# Patient Record
(demographics unavailable — no encounter records)

---

## 2024-11-11 NOTE — COUNSELING
[Underweight - ( BMI  <18.5 )] : underweight - ( BMI  <18.5 ) [Non - Smoker] : non-smoker [Smoke/CO Detectors] : smoke/CO detectors [Use grab bars] : use grab bars [Discussed disease trajectory with patient/caregiver] : discussed disease trajectory with patient/caregiver

## 2024-11-18 NOTE — COMPREHENSIVE ASSESSMENT
[Comprehensive Assessment Reviewed] : Comprehensive assessment reviewed
[Comprehensive Assessment Reviewed] : Comprehensive assessment reviewed
good, to achieve stated therapy goals

## 2024-11-22 NOTE — REASON FOR VISIT
[Post-hospitalization from ___ Hospital] : Post-hospitalization from [unfilled] Hospital [Discharge Summary] : discharge summary [Follow-Up] : a follow-up visit [Family Member] : family member [Pre-Visit Preparation] : pre-visit preparation was done [FreeTextEntry2] : Chart Review

## 2024-11-22 NOTE — PHYSICAL EXAM
[No Acute Distress] : no acute distress [Normal Sclera/Conjunctiva] : normal sclera/conjunctiva [EOMI] : extra ocular movement intact [Normal Outer Ear/Nose] : the ears and nose were normal in appearance [Normal Oropharynx] : the oropharynx was normal [Supple] : the neck was supple [No Respiratory Distress] : no respiratory distress [Clear to Auscultation] : lungs were clear to auscultation bilaterally [No Accessory Muscle Use] : no accessory muscle use [Normal Rate] : heart rate was normal  [Regular Rhythm] : with a regular rhythm [Normal S1, S2] : normal S1 and S2 [No Murmurs] : no murmurs heard [No Edema] : there was no peripheral edema [Normal Bowel Sounds] : normal bowel sounds [Non Tender] : non-tender [Soft] : abdomen soft [Not Distended] : not distended [Patient Refused] : rectal exam was refused by the patient [No CVA Tenderness] : no ~M costovertebral angle tenderness [Normal Strength/Tone] : muscle strength and tone were normal [No Rash] : no rash [de-identified] : Cataract surgery to bilateral eyes  [de-identified] : Upper dentures  [de-identified] : Breast cancer with mastectomy to left breast diagnosed in 2019

## 2024-11-22 NOTE — PHYSICAL EXAM
[No Acute Distress] : no acute distress [Normal Sclera/Conjunctiva] : normal sclera/conjunctiva [EOMI] : extra ocular movement intact [Normal Outer Ear/Nose] : the ears and nose were normal in appearance [Normal Oropharynx] : the oropharynx was normal [Supple] : the neck was supple [No Respiratory Distress] : no respiratory distress [Clear to Auscultation] : lungs were clear to auscultation bilaterally [No Accessory Muscle Use] : no accessory muscle use [Normal Rate] : heart rate was normal  [Regular Rhythm] : with a regular rhythm [Normal S1, S2] : normal S1 and S2 [No Murmurs] : no murmurs heard [No Edema] : there was no peripheral edema [Normal Bowel Sounds] : normal bowel sounds [Non Tender] : non-tender [Soft] : abdomen soft [Not Distended] : not distended [Patient Refused] : rectal exam was refused by the patient [No CVA Tenderness] : no ~M costovertebral angle tenderness [Normal Strength/Tone] : muscle strength and tone were normal [No Rash] : no rash [de-identified] : Cataract surgery to bilateral eyes  [de-identified] : Upper dentures  [de-identified] : Breast cancer with mastectomy to left breast diagnosed in 2019

## 2024-11-22 NOTE — PHYSICAL EXAM
[No Acute Distress] : no acute distress [Normal Sclera/Conjunctiva] : normal sclera/conjunctiva [EOMI] : extra ocular movement intact [Normal Outer Ear/Nose] : the ears and nose were normal in appearance [Normal Oropharynx] : the oropharynx was normal [Supple] : the neck was supple [No Respiratory Distress] : no respiratory distress [Clear to Auscultation] : lungs were clear to auscultation bilaterally [No Accessory Muscle Use] : no accessory muscle use [Normal Rate] : heart rate was normal  [Regular Rhythm] : with a regular rhythm [Normal S1, S2] : normal S1 and S2 [No Murmurs] : no murmurs heard [No Edema] : there was no peripheral edema [Normal Bowel Sounds] : normal bowel sounds [Non Tender] : non-tender [Soft] : abdomen soft [Not Distended] : not distended [Patient Refused] : rectal exam was refused by the patient [No CVA Tenderness] : no ~M costovertebral angle tenderness [Normal Strength/Tone] : muscle strength and tone were normal [No Rash] : no rash [de-identified] : Cataract surgery to bilateral eyes  [de-identified] : Upper dentures  [de-identified] : Breast cancer with mastectomy to left breast diagnosed in 2019

## 2024-11-22 NOTE — HEALTH RISK ASSESSMENT
[Independent] : managing medications [Some assistance needed] : preparing meals [Full assistance needed] : managing finances [Any fall with injury in past year] : Patient reported fall with injury in the past year [No] : The patient does not have visual impairment [TimeGetUpGo] : 20 [de-identified] : Fell about 2-3 weeks ago and bumped her left elbow, no change in ROM, no injury

## 2024-11-22 NOTE — PHYSICAL EXAM
[No Acute Distress] : no acute distress [Normal Sclera/Conjunctiva] : normal sclera/conjunctiva [EOMI] : extra ocular movement intact [Normal Outer Ear/Nose] : the ears and nose were normal in appearance [Normal Oropharynx] : the oropharynx was normal [Supple] : the neck was supple [No Respiratory Distress] : no respiratory distress [Clear to Auscultation] : lungs were clear to auscultation bilaterally [No Accessory Muscle Use] : no accessory muscle use [Normal Rate] : heart rate was normal  [Regular Rhythm] : with a regular rhythm [Normal S1, S2] : normal S1 and S2 [No Murmurs] : no murmurs heard [No Edema] : there was no peripheral edema [Normal Bowel Sounds] : normal bowel sounds [Non Tender] : non-tender [Soft] : abdomen soft [Not Distended] : not distended [Patient Refused] : rectal exam was refused by the patient [No CVA Tenderness] : no ~M costovertebral angle tenderness [Normal Strength/Tone] : muscle strength and tone were normal [No Rash] : no rash [de-identified] : Cataract surgery to bilateral eyes  [de-identified] : Upper dentures  [de-identified] : Breast cancer with mastectomy to left breast diagnosed in 2019

## 2024-11-22 NOTE — PHYSICAL EXAM
[No Acute Distress] : no acute distress [Normal Sclera/Conjunctiva] : normal sclera/conjunctiva [EOMI] : extra ocular movement intact [Normal Outer Ear/Nose] : the ears and nose were normal in appearance [Normal Oropharynx] : the oropharynx was normal [Supple] : the neck was supple [No Respiratory Distress] : no respiratory distress [Clear to Auscultation] : lungs were clear to auscultation bilaterally [No Accessory Muscle Use] : no accessory muscle use [Normal Rate] : heart rate was normal  [Regular Rhythm] : with a regular rhythm [Normal S1, S2] : normal S1 and S2 [No Murmurs] : no murmurs heard [No Edema] : there was no peripheral edema [Normal Bowel Sounds] : normal bowel sounds [Non Tender] : non-tender [Soft] : abdomen soft [Not Distended] : not distended [Patient Refused] : rectal exam was refused by the patient [No CVA Tenderness] : no ~M costovertebral angle tenderness [Normal Strength/Tone] : muscle strength and tone were normal [No Rash] : no rash [de-identified] : Cataract surgery to bilateral eyes  [de-identified] : Upper dentures  [de-identified] : Breast cancer with mastectomy to left breast diagnosed in 2019

## 2024-11-22 NOTE — HISTORY OF PRESENT ILLNESS
[Patient is stable] : patient is stable [Patient] : patient [Family Member] : family member [FreeTextEntry2] : This is a 70-year-old female pt with breast cancer with mastectomy to left breast diagnosed in 2019, Leukemia s/p bone marrow transplant, Hypertension, CHF, CKD4, DM2. Pt being seen for initial exam.  Today's visit Pt reported she is feeling great- Pt denies SOB and tiredness -No edema noted in lower extremities -MOLST form completed -Pt has a PCP appointment tomorrow with DR. Nancy Myers for follow-up, as per dtr lab will be drawn at the pcp's office to check pt's potassium level. Pt potassium level was 3.4 during hospitalization on 11/14/2024. - CKD being followed Nephrologist Dr Blade Ngo - Cardiologist Yuan Antonio - Oncologist Dr Annita Bhardwaj  Subjective -Appetite/weight: Appetite fair to poor, eats regular food -Gait/falls: No falls reported no change in ROM, no injury -Sleep: Varies  -BMs: Continent, BM daily -Urine: Continent  -Skin: intact, no rash -Mood/memory: Able to make needs known    [facility] 11/11/2024 to 11/14/2024 [facility] 6/11/2023 for SOB Diuresed for CHF  Specialist - Nephrologist Dr Blade Ngo - Cardiologist Yuan Antonio - Oncologist Dr Annita Bhardwaj  [facility] [place]

## 2024-11-22 NOTE — HEALTH RISK ASSESSMENT
[Independent] : managing medications [Some assistance needed] : preparing meals [Full assistance needed] : managing finances [Any fall with injury in past year] : Patient reported fall with injury in the past year [No] : The patient does not have visual impairment [TimeGetUpGo] : 20 [de-identified] : Fell about 2-3 weeks ago and bumped her left elbow, no change in ROM, no injury

## 2024-11-22 NOTE — HEALTH RISK ASSESSMENT
[Independent] : managing medications [Some assistance needed] : preparing meals [Full assistance needed] : managing finances [Any fall with injury in past year] : Patient reported fall with injury in the past year [No] : The patient does not have visual impairment [TimeGetUpGo] : 20 [de-identified] : Fell about 2-3 weeks ago and bumped her left elbow, no change in ROM, no injury

## 2024-11-22 NOTE — HEALTH RISK ASSESSMENT
[Independent] : managing medications [Some assistance needed] : preparing meals [Full assistance needed] : managing finances [Any fall with injury in past year] : Patient reported fall with injury in the past year [No] : The patient does not have visual impairment [TimeGetUpGo] : 20 [de-identified] : Fell about 2-3 weeks ago and bumped her left elbow, no change in ROM, no injury

## 2024-11-22 NOTE — HISTORY OF PRESENT ILLNESS
[Patient is stable] : patient is stable [Patient] : patient [Family Member] : family member [FreeTextEntry2] : This is a 70-year-old female pt with breast cancer with mastectomy to left breast diagnosed in 2019, Leukemia s/p bone marrow transplant, Hypertension, CHF, CKD4, DM2. Pt being seen for initial exam.  Today's visit -Pt reported she took the flu vaccine last month at her PCP office -Pt reported feeling tired -The caregiver states that she recently spoke with the patient's nephrologist, who confirmed that the patient requires dialysis. During the patient's hospitalization in June, she initially refused dialysis, but she has since expressed a willingness to begin treatment. The patient is currently experiencing swelling in her legs and has gained 1 lb since yesterday. The patient's daughter reported that she had a conversation with the nephrologist to arrange for the patient to start dialysis, but the next available appointment is a couple of months away. Given the patient's condition, the daughter is concerned that waiting that long is not viable. The patient has already reached out to the PCP's office, requesting that the PCP contact the nephrologist to expedite the dialysis appointment. Additionally, the caregiver has been instructed to contact our main line in case of an emergency. No SOB reported during visit. No falls reported, no change in ROM, no injury. - CKD being followed Nephrologist Dr Blade Ngo - Cardiologist Yuan Antonio - Oncologist Dr Annita Bhardwaj  Subjective -Appetite/weight: Appetite fair to poor, eats regular food -Gait/falls: No falls reported no change in ROM, no injury -Sleep: Varies  -BMs: Continent, BM daily -Urine: Continent  -Skin: intact, no rash -Mood/memory: Able to make needs known    [facility] 6/11/2023 for SOB Diuresed for CHF  Specialist - Nephrologist Dr Blade Ngo - Cardiologist Yuan Antonio - Oncologist Dr Annita Bhardwaj  [facility] [place]

## 2024-11-22 NOTE — PHYSICAL EXAM
[No Acute Distress] : no acute distress [Normal Sclera/Conjunctiva] : normal sclera/conjunctiva [EOMI] : extra ocular movement intact [Normal Outer Ear/Nose] : the ears and nose were normal in appearance [Normal Oropharynx] : the oropharynx was normal [Supple] : the neck was supple [No Respiratory Distress] : no respiratory distress [Clear to Auscultation] : lungs were clear to auscultation bilaterally [No Accessory Muscle Use] : no accessory muscle use [Normal Rate] : heart rate was normal  [Regular Rhythm] : with a regular rhythm [Normal S1, S2] : normal S1 and S2 [No Murmurs] : no murmurs heard [No Edema] : there was no peripheral edema [Normal Bowel Sounds] : normal bowel sounds [Non Tender] : non-tender [Soft] : abdomen soft [Not Distended] : not distended [Patient Refused] : rectal exam was refused by the patient [No CVA Tenderness] : no ~M costovertebral angle tenderness [Normal Strength/Tone] : muscle strength and tone were normal [No Rash] : no rash [de-identified] : Cataract surgery to bilateral eyes  [de-identified] : Upper dentures  [de-identified] : Breast cancer with mastectomy to left breast diagnosed in 2019

## 2024-11-22 NOTE — HISTORY OF PRESENT ILLNESS
[Patient is stable] : patient is stable [Patient] : patient [Family Member] : family member [FreeTextEntry2] : This is a 70-year-old female pt with breast cancer with mastectomy to left breast diagnosed in 2019, Leukemia s/p bone marrow transplant, Hypertension, CHF, CKD4, DM2. Pt being seen for initial exam.  Today's visit -Pt reported she took the flu vaccine last month at her PCP office -Pt reported feeling tired -The caregiver states that she recently spoke with the patient's nephrologist, who confirmed that the patient requires dialysis. During the patient's hospitalization in June, she initially refused dialysis, but she has since expressed a willingness to begin treatment. The patient is currently experiencing swelling in her legs and has gained 1 lb since yesterday. The patient's daughter reported that she had a conversation with the nephrologist to arrange for the patient to start dialysis, but the next available appointment is a couple of months away. Given the patient's condition, the daughter is concerned that waiting that long is not viable. The patient has already reached out to the PCP's office, requesting that the PCP contact the nephrologist to expedite the dialysis appointment. Additionally, the caregiver has been instructed to contact our main line in case of an emergency. No SOB reported during visit. No falls reported, no change in ROM, no injury. - CKD being followed Nephrologist Dr Blade Ngo - Cardiologist Yuan Antonio - Oncologist Dr Annita Bhardwaj  Subjective -Appetite/weight: Appetite fair to poor, eats regular food -Gait/falls: No falls reported no change in ROM, no injury -Sleep: Varies  -BMs: Continent, BM daily -Urine: Continent  -Skin: intact, no rash -Mood/memory: Able to make needs known    [facility] 6/11/2023 for SOB Diuresed for CHF  Specialist - Nephrologist Dr Blade Ngo - Cardiologist Yuan Antonio - Oncologist Dr Annita Bhardwaj  [facility] [place] shower chair Grab Bar Rollator  Labs:  2/4  GBS neg    1/12   GCT 91    7/13  GC/CT neg  HBsAg NR  RPR NR  measles immune  varicella immune  rubella immune  mumps immune  O pos, antibody neg    Sono:  10/26: 21w2d, EFW 413g (45%), vtx, ant placenta, normal anatomy, limited heart views  11/9: 23w2d, cephalic, ant placenta, normal heart anatomy

## 2024-11-22 NOTE — HISTORY OF PRESENT ILLNESS
[Patient is stable] : patient is stable [Patient] : patient [Family Member] : family member [FreeTextEntry2] : This is a 70-year-old female pt with breast cancer with mastectomy to left breast diagnosed in 2019, Leukemia s/p bone marrow transplant, Hypertension, CHF, CKD4, DM2. Pt being seen for initial exam.  Today's visit -Pt reported she took the flu vaccine last month at her PCP office -Pt reported feeling tired -The caregiver states that she recently spoke with the patient's nephrologist, who confirmed that the patient requires dialysis. During the patient's hospitalization in June, she initially refused dialysis, but she has since expressed a willingness to begin treatment. The patient is currently experiencing swelling in her legs and has gained 1 lb since yesterday. The patient's daughter reported that she had a conversation with the nephrologist to arrange for the patient to start dialysis, but the next available appointment is a couple of months away. Given the patient's condition, the daughter is concerned that waiting that long is not viable. The patient has already reached out to the PCP's office, requesting that the PCP contact the nephrologist to expedite the dialysis appointment. Additionally, the caregiver has been instructed to contact our main line in case of an emergency. No SOB reported during visit. No falls reported, no change in ROM, no injury. - CKD being followed Nephrologist Dr Blade Ngo - Cardiologist Yuan Antonio - Oncologist Dr Annita Bhardwaj  Subjective -Appetite/weight: Appetite fair to poor, eats regular food -Gait/falls: No falls reported no change in ROM, no injury -Sleep: Varies  -BMs: Continent, BM daily -Urine: Continent  -Skin: intact, no rash -Mood/memory: Able to make needs known    [facility] 6/11/2023 for SOB Diuresed for CHF  Specialist - Nephrologist Dr Blade Ngo - Cardiologist Yuan Antonio - Oncologist Dr Annita Bhardwaj  [facility] [place] shower chair Grab Bar Rollator

## 2024-11-22 NOTE — HISTORY OF PRESENT ILLNESS
[Patient is stable] : patient is stable [Patient] : patient [Family Member] : family member [FreeTextEntry2] : This is a 70-year-old female pt with breast cancer with mastectomy to left breast diagnosed in 2019, Leukemia s/p bone marrow transplant, Hypertension, CHF, CKD4, DM2. Pt being seen for initial exam.  Today's visit Pt reported she is feeling great- Pt denies SOB and tiredness -No edema noted in lower extremities -MOLST form completed -Pt has a PCP appointment tomorrow with DR. Nancy Myers for follow-up, as per dtr lab will be drawn at the pcp's office to check pt's potassium level. Pt potassium level was 3.4 during hospitalization on 11/14/2024. - CKD being followed Nephrologist Dr Blade Ngo - Cardiologist Yuan Antonio - Oncologist Dr Annita Bhardwaj  Subjective -Appetite/weight: Appetite fair to poor, eats regular food -Gait/falls: No falls reported no change in ROM, no injury -Sleep: Varies  -BMs: Continent, BM daily -Urine: Continent  -Skin: intact, no rash -Mood/memory: Able to make needs known    [facility] 11/11/2024 to 11/14/2024 [facility] 6/11/2023 for SOB Diuresed for CHF  Specialist - Nephrologist Dr Blade Ngo - Cardiologist Yuan Antonio - Oncologist Dr Annita Bhardwaj  [facility] [place] shower chair Grab Bar Rollator

## 2024-11-22 NOTE — HISTORY OF PRESENT ILLNESS
[Patient is stable] : patient is stable [Patient] : patient [Family Member] : family member [FreeTextEntry2] : This is a 70-year-old female pt with breast cancer with mastectomy to left breast diagnosed in 2019, Leukemia s/p bone marrow transplant, Hypertension, CHF, CKD4, DM2. Pt being seen for initial exam.  Today's visit -Pt reported she took the flu vaccine last month at her PCP office -Pt reported feeling tired -The caregiver states that she recently spoke with the patient's nephrologist, who confirmed that the patient requires dialysis. During the patient's hospitalization in June, she initially refused dialysis, but she has since expressed a willingness to begin treatment. The patient is currently experiencing swelling in her legs and has gained 1 lb since yesterday. The patient's daughter reported that she had a conversation with the nephrologist to arrange for the patient to start dialysis, but the next available appointment is a couple of months away. Given the patient's condition, the daughter is concerned that waiting that long is not viable. The patient has already reached out to the PCP's office, requesting that the PCP contact the nephrologist to expedite the dialysis appointment. Additionally, the caregiver has been instructed to contact our main line in case of an emergency. No SOB reported during visit. No falls reported, no change in ROM, no injury. - CKD being followed Nephrologist Dr Blade Ngo - Cardiologist Yuan Antonio - Oncologist Dr Annita Bhardwaj  Subjective -Appetite/weight: Appetite fair to poor, eats regular food -Gait/falls: No falls reported no change in ROM, no injury -Sleep: Varies  -BMs: Continent, BM daily -Urine: Continent  -Skin: intact, no rash -Mood/memory: Able to make needs known    [facility] 6/11/2023 for SOB Diuresed for CHF  Specialist - Nephrologist Dr Blade Ngo - Cardiologist Yuan Antonio - Oncologist Dr Annita Bhardwaj  [facility] [place] shower [place] Grab Bar Rollator

## 2024-11-22 NOTE — HEALTH RISK ASSESSMENT
[Independent] : managing medications [Some assistance needed] : preparing meals [Full assistance needed] : managing finances [Any fall with injury in past year] : Patient reported fall with injury in the past year [No] : The patient does not have visual impairment [TimeGetUpGo] : 20 [de-identified] : Fell about 2-3 weeks ago and bumped her left elbow, no change in ROM, no injury

## 2024-11-22 NOTE — REASON FOR VISIT
[Follow-Up] : a follow-up visit [Family Member] : family member [Pre-Visit Preparation] : pre-visit preparation was done [FreeTextEntry2] : Chart Review

## 2024-11-22 NOTE — HEALTH RISK ASSESSMENT
[Independent] : managing medications [Some assistance needed] : preparing meals [Full assistance needed] : managing finances [Any fall with injury in past year] : Patient reported fall with injury in the past year [No] : The patient does not have visual impairment [TimeGetUpGo] : 20 [de-identified] : Fell about 2-3 weeks ago and bumped her left elbow, no change in ROM, no injury